# Patient Record
Sex: MALE | Race: WHITE | Employment: FULL TIME | URBAN - METROPOLITAN AREA
[De-identification: names, ages, dates, MRNs, and addresses within clinical notes are randomized per-mention and may not be internally consistent; named-entity substitution may affect disease eponyms.]

---

## 2024-08-16 ENCOUNTER — OFFICE VISIT (OUTPATIENT)
Dept: UROLOGY | Age: 34
End: 2024-08-16
Payer: COMMERCIAL

## 2024-08-16 DIAGNOSIS — R35.0 URINARY FREQUENCY: Primary | ICD-10-CM

## 2024-08-16 DIAGNOSIS — R35.1 NOCTURIA: ICD-10-CM

## 2024-08-16 LAB
BILIRUBIN, URINE, POC: NEGATIVE
BLOOD URINE, POC: NEGATIVE
GLUCOSE URINE, POC: NEGATIVE
KETONES, URINE, POC: NEGATIVE
LEUKOCYTE ESTERASE, URINE, POC: NEGATIVE
NITRITE, URINE, POC: NEGATIVE
PH, URINE, POC: 5.5 (ref 4.6–8)
PROTEIN,URINE, POC: NEGATIVE
SPECIFIC GRAVITY, URINE, POC: 1.02 (ref 1–1.03)
URINALYSIS CLARITY, POC: NORMAL
URINALYSIS COLOR, POC: NORMAL
UROBILINOGEN, POC: NORMAL

## 2024-08-16 PROCEDURE — 81003 URINALYSIS AUTO W/O SCOPE: CPT | Performed by: NURSE PRACTITIONER

## 2024-08-16 PROCEDURE — 99204 OFFICE O/P NEW MOD 45 MIN: CPT | Performed by: NURSE PRACTITIONER

## 2024-08-16 RX ORDER — FAMOTIDINE 20 MG/1
TABLET, FILM COATED ORAL
COMMUNITY

## 2024-08-16 RX ORDER — PANTOPRAZOLE SODIUM 40 MG/1
90 TABLET, DELAYED RELEASE ORAL
COMMUNITY
Start: 2024-07-15

## 2024-08-16 RX ORDER — MIRABEGRON 50 MG/1
50 TABLET, EXTENDED RELEASE ORAL DAILY
Qty: 90 TABLET | Refills: 3 | Status: SHIPPED | OUTPATIENT
Start: 2024-08-16

## 2024-08-16 ASSESSMENT — ENCOUNTER SYMPTOMS
BACK PAIN: 0
BLOOD IN STOOL: 0
WHEEZING: 0
NAUSEA: 0
ABDOMINAL PAIN: 0
DIARRHEA: 0
HEARTBURN: 0
EYE DISCHARGE: 0
SHORTNESS OF BREATH: 0
VOMITING: 0
EYE PAIN: 0
CONSTIPATION: 0
COUGH: 0
SKIN LESIONS: 0

## 2024-08-16 NOTE — PROGRESS NOTES
[facility] UROLOGY  [address] [place] [id number]  [phone]          Varun Marie  : 1990    Chief Complaint   Patient presents with    New Patient          HPI     Varun Marie is a 33 y.o. male  Here today referred by primary care secondary to ongoing issues with urinary frequency and nocturia.  Patient says that the symptoms have been going on for 6 or 7 years.  He thinks that perhaps he has enlarged prostate.  He reports that his dad and uncle both have had issues with enlargement in the past.    As far as the daytime he voids at least every 2 hours.  There are times however that he feels he needs to go more frequently than that.  Urine flow and stream are weak and he has a lot of dribbling before and after.  He is up as much is 6 times a night to void and again he feels he has to push and strain in order to completely empty the bladder.  Coffee seems to make the symptoms worse.  If he holds the urine in the bladder he will develop some pain and discomfort.  He is not having any significant dysuria.    No fever chills or hematuria have been reported.  He has not been given any medication to try.        History reviewed. No pertinent past medical history.  No past surgical history on file.  Current Outpatient Medications   Medication Sig Dispense Refill    famotidine (PEPCID) 20 MG tablet 30 30      pantoprazole (PROTONIX) 40 MG tablet 90 tablets      mirabegron (MYRBETRIQ) 50 MG TB24 Take 50 mg by mouth daily 90 tablet 3     No current facility-administered medications for this visit.     No Known Allergies  Social History     Socioeconomic History    Marital status:      Spouse name: Not on file    Number of children: Not on file    Years of education: Not on file    Highest education level: Not on file   Occupational History    Not on file   Tobacco Use    Smoking status: Not on file    Smokeless tobacco: Not on file   Substance and Sexual Activity    Alcohol use: Not on file

## 2024-09-13 ENCOUNTER — TELEPHONE (OUTPATIENT)
Dept: UROLOGY | Age: 34
End: 2024-09-13

## 2024-09-13 ENCOUNTER — OFFICE VISIT (OUTPATIENT)
Dept: UROLOGY | Age: 34
End: 2024-09-13
Payer: COMMERCIAL

## 2024-09-13 DIAGNOSIS — R35.0 URINARY FREQUENCY: ICD-10-CM

## 2024-09-13 DIAGNOSIS — R35.1 NOCTURIA: ICD-10-CM

## 2024-09-13 DIAGNOSIS — R35.0 URINARY FREQUENCY: Primary | ICD-10-CM

## 2024-09-13 LAB
BILIRUBIN, URINE, POC: NEGATIVE
BLOOD URINE, POC: NEGATIVE
GLUCOSE URINE, POC: NEGATIVE
KETONES, URINE, POC: NEGATIVE
LEUKOCYTE ESTERASE, URINE, POC: NEGATIVE
NITRITE, URINE, POC: NEGATIVE
PH, URINE, POC: 6.5 (ref 4.6–8)
PROTEIN,URINE, POC: NEGATIVE
SPECIFIC GRAVITY, URINE, POC: 1.03 (ref 1–1.03)
URINALYSIS CLARITY, POC: NORMAL
URINALYSIS COLOR, POC: NORMAL
UROBILINOGEN, POC: NORMAL

## 2024-09-13 PROCEDURE — 81003 URINALYSIS AUTO W/O SCOPE: CPT | Performed by: NURSE PRACTITIONER

## 2024-09-13 PROCEDURE — 99214 OFFICE O/P EST MOD 30 MIN: CPT | Performed by: NURSE PRACTITIONER

## 2024-09-13 ASSESSMENT — ENCOUNTER SYMPTOMS
NAUSEA: 0
BACK PAIN: 0

## 2024-09-26 PROBLEM — R35.0 URINARY FREQUENCY: Status: ACTIVE | Noted: 2024-09-13

## 2024-09-26 PROBLEM — R35.1 NOCTURIA: Status: ACTIVE | Noted: 2024-09-13

## 2024-11-22 ENCOUNTER — OFFICE VISIT (OUTPATIENT)
Dept: UROLOGY | Age: 34
End: 2024-11-22
Payer: COMMERCIAL

## 2024-11-22 DIAGNOSIS — R35.0 URINARY FREQUENCY: Primary | ICD-10-CM

## 2024-11-22 DIAGNOSIS — R35.1 NOCTURIA: ICD-10-CM

## 2024-11-22 DIAGNOSIS — R39.89 URETHRAL PAIN: ICD-10-CM

## 2024-11-22 PROCEDURE — 99214 OFFICE O/P EST MOD 30 MIN: CPT | Performed by: NURSE PRACTITIONER

## 2024-11-22 RX ORDER — ALFUZOSIN HYDROCHLORIDE 10 MG/1
10 TABLET, EXTENDED RELEASE ORAL DAILY
Qty: 30 TABLET | Refills: 0 | Status: SHIPPED | OUTPATIENT
Start: 2024-11-22

## 2024-11-22 RX ORDER — DOXYCYCLINE 100 MG/1
100 TABLET ORAL 2 TIMES DAILY
Qty: 20 TABLET | Refills: 0 | Status: SHIPPED | OUTPATIENT
Start: 2024-11-22 | End: 2024-12-02

## 2024-11-22 ASSESSMENT — ENCOUNTER SYMPTOMS
NAUSEA: 0
BACK PAIN: 0

## 2024-11-22 NOTE — PROGRESS NOTES
[facility]OLOGY  [address] [place] [address] [id number] [phone]          Varun Marie  : 1990    Chief Complaint   Patient presents with    Follow-up          HPI     Varun Marie is a 34 y.o. male  Returns today to discuss proceeding with further invention.  When I saw him last I had recommended a cystoscopy under anesthesia.  I felt that the hydrodistention would be beneficial as well.  He canceled that procedure and wanted to talk further about the symptoms that he is exhibiting.    Initially he was referred by primary care secondary to ongoing issues with urinary frequency and nocturia.  Patient says that the symptoms have been going on for 6 or 7 years.  He thinks that perhaps he has enlarged prostate.  He reports that his dad and uncle both have had issues with enlargement in the past.  New symptom is when he bends certain ways he will have pain in the urethra as well.  I gave him a round of Myrbetriq thinking that perhaps he was having OAB symptoms.  When I saw him back however he did not see any improvement with the medication.  I explained that the medication was for the bladder because I felt he was young and at this time the prostate really should not be an issue.     As far as the daytime he voids at least every 2 hours.  There are times however that he feels he needs to go more frequently than that.  Urine flow and stream are weak and he has a lot of dribbling before and after.  He is up as much is 6 times a night to void and again he feels he has to push and strain in order to completely empty the bladder.  Coffee seems to make the symptoms worse.  If he holds the urine in the bladder he will develop some pain and discomfort.  He is not having any significant dysuria.     No fever chills or hematuria have been reported.  He has not been given any medication to try.    Past Medical History:   Diagnosis Date    Liver disease     UTI (urinary tract infection)

## 2024-12-13 ENCOUNTER — TELEPHONE (OUTPATIENT)
Dept: UROLOGY | Age: 34
End: 2024-12-13

## 2024-12-13 ENCOUNTER — OFFICE VISIT (OUTPATIENT)
Dept: UROLOGY | Age: 34
End: 2024-12-13
Payer: COMMERCIAL

## 2024-12-13 DIAGNOSIS — R39.89 URETHRAL PAIN: ICD-10-CM

## 2024-12-13 DIAGNOSIS — R35.0 URINARY FREQUENCY: Primary | ICD-10-CM

## 2024-12-13 DIAGNOSIS — R39.89 BLADDER PAIN: ICD-10-CM

## 2024-12-13 LAB
BILIRUBIN, URINE, POC: NEGATIVE
BLOOD URINE, POC: NEGATIVE
GLUCOSE URINE, POC: NEGATIVE MG/DL
KETONES, URINE, POC: NEGATIVE MG/DL
LEUKOCYTE ESTERASE, URINE, POC: NEGATIVE
NITRITE, URINE, POC: NEGATIVE
PH, URINE, POC: 6.5 (ref 4.6–8)
PROTEIN,URINE, POC: NEGATIVE MG/DL
SPECIFIC GRAVITY, URINE, POC: 1.02 (ref 1–1.03)
URINALYSIS CLARITY, POC: NORMAL
URINALYSIS COLOR, POC: NORMAL
UROBILINOGEN, POC: NORMAL MG/DL

## 2024-12-13 PROCEDURE — 81003 URINALYSIS AUTO W/O SCOPE: CPT | Performed by: NURSE PRACTITIONER

## 2024-12-13 PROCEDURE — 99214 OFFICE O/P EST MOD 30 MIN: CPT | Performed by: NURSE PRACTITIONER

## 2024-12-13 ASSESSMENT — ENCOUNTER SYMPTOMS
NAUSEA: 0
BACK PAIN: 0

## 2024-12-13 NOTE — TELEPHONE ENCOUNTER
Please schedule patient for cystoscopy) complaining of hypertension, lymphadenopathy noted.    Orders are done    Thank you,   Ally

## 2024-12-13 NOTE — PROGRESS NOTES
[facility] UROLOGY  [address] [place] [id number]  [phone]          Varun Marie  : 1990    Chief Complaint   Patient presents with    Follow-up          HPI     Varun Marie is a 34 y.o. male  Returns today to discuss proceeding with further invention.  When I saw him last I had recommended a cystoscopy under anesthesia.  I felt that the hydrodistention would be beneficial as well.  He canceled that procedure and wanted to talk further about the symptoms that he is exhibiting.  He has failed antibiotic treatment, alpha-blocker and also anticholinergic.  He feels at this point the next best step is to proceed with cystoscopy.  Again I feel cystoscopy upon distention may be the best option as the symptoms have been going on for 6 or 7 years.     Initially he was referred by primary care secondary to ongoing issues with urinary frequency and nocturia.  Patient says that the symptoms have been going on for 6 or 7 years.  He thinks that perhaps he has enlarged prostate.  He reports that his dad and uncle both have had issues with enlargement in the past.  New symptom is when he bends certain ways he will have pain in the urethra as well.  I gave him a round of Myrbetriq thinking that perhaps he was having OAB symptoms.  When I saw him back however he did not see any improvement with the medication.  I explained that the medication was for the bladder because I felt he was young and at this time the prostate really should not be an issue.     As far as the daytime he voids at least every 2 hours.  There are times however that he feels he needs to go more frequently than that.  Urine flow and stream are weak and he has a lot of dribbling before and after.  He is up as much is 6 times a night to void and again he feels he has to push and strain in order to completely empty the bladder.  Coffee seems to make the symptoms worse.  If he holds the urine in the bladder he will develop

## 2024-12-16 RX ORDER — ALFUZOSIN HYDROCHLORIDE 10 MG/1
10 TABLET, EXTENDED RELEASE ORAL DAILY
Qty: 30 TABLET | Refills: 0 | Status: SHIPPED | OUTPATIENT
Start: 2024-12-16

## 2025-01-12 RX ORDER — ALFUZOSIN HYDROCHLORIDE 10 MG/1
10 TABLET, EXTENDED RELEASE ORAL DAILY
Qty: 30 TABLET | Refills: 0 | Status: SHIPPED | OUTPATIENT
Start: 2025-01-12

## 2025-01-14 PROBLEM — R39.89 BLADDER PAIN: Status: ACTIVE | Noted: 2024-12-13

## 2025-01-14 NOTE — TELEPHONE ENCOUNTER
Procedures: Procedure(s):   CYSTOSCOPY WITH BLADDER HYDRODISTENTION   Date: 2/14/2025   Time: 0800   Location: [facility] MAIN OR 01 CYSTO     Scheduled.  Post op appt scheduled.

## 2025-02-12 ENCOUNTER — TELEPHONE (OUTPATIENT)
Dept: UROLOGY | Age: 35
End: 2025-02-12

## 2025-02-13 ENCOUNTER — TELEPHONE (OUTPATIENT)
Dept: UROLOGY | Age: 35
End: 2025-02-13

## 2025-02-13 DIAGNOSIS — R39.89 URETHRAL PAIN: ICD-10-CM

## 2025-02-13 DIAGNOSIS — R35.1 NOCTURIA: ICD-10-CM

## 2025-02-13 DIAGNOSIS — R39.89 BLADDER PAIN: Primary | ICD-10-CM

## 2025-02-13 DIAGNOSIS — R35.0 URINARY FREQUENCY: ICD-10-CM

## 2025-02-13 NOTE — TELEPHONE ENCOUNTER
Pt states he was told by insurance that he can not have surgery at [facility] as it would not be covered.  I called and spoke to vikram who states it will be covered as long as in network with [facility] which we are.  Lm for the patient to see how he would like to proceed.

## 2025-02-13 NOTE — TELEPHONE ENCOUNTER
We are not in network with the patients insurance for this year.  He was told he needs to go to [facility] and sent me the email he was sent.  I am canceling the cysto hydro for tomorrow with Dr Pizano.  Can we please put in a referral to [facility] urology.